# Patient Record
Sex: FEMALE
[De-identification: names, ages, dates, MRNs, and addresses within clinical notes are randomized per-mention and may not be internally consistent; named-entity substitution may affect disease eponyms.]

---

## 2024-09-24 PROBLEM — Z00.00 ENCOUNTER FOR PREVENTIVE HEALTH EXAMINATION: Status: ACTIVE | Noted: 2024-09-24

## 2024-09-26 ENCOUNTER — APPOINTMENT (OUTPATIENT)
Dept: OTOLARYNGOLOGY | Facility: CLINIC | Age: 64
End: 2024-09-26
Payer: MEDICAID

## 2024-09-26 VITALS
SYSTOLIC BLOOD PRESSURE: 132 MMHG | DIASTOLIC BLOOD PRESSURE: 83 MMHG | BODY MASS INDEX: 25.52 KG/M2 | HEIGHT: 63 IN | HEART RATE: 75 BPM | WEIGHT: 144 LBS | OXYGEN SATURATION: 98 %

## 2024-09-26 DIAGNOSIS — R13.10 DYSPHAGIA, UNSPECIFIED: ICD-10-CM

## 2024-09-26 DIAGNOSIS — R49.0 DYSPHONIA: ICD-10-CM

## 2024-09-26 DIAGNOSIS — Z86.79 PERSONAL HISTORY OF OTHER DISEASES OF THE CIRCULATORY SYSTEM: ICD-10-CM

## 2024-09-26 DIAGNOSIS — Z86.39 PERSONAL HISTORY OF OTHER ENDOCRINE, NUTRITIONAL AND METABOLIC DISEASE: ICD-10-CM

## 2024-09-26 PROCEDURE — 31579 LARYNGOSCOPY TELESCOPIC: CPT

## 2024-09-26 PROCEDURE — 99203 OFFICE O/P NEW LOW 30 MIN: CPT | Mod: 25

## 2024-09-26 RX ORDER — AZELASTINE HYDROCHLORIDE 137 UG/1
0.1 SPRAY, METERED NASAL
Refills: 0 | Status: ACTIVE | COMMUNITY

## 2024-09-26 RX ORDER — ALBUTEROL SULFATE 2.5 MG/3ML
(2.5 MG/3ML) SOLUTION RESPIRATORY (INHALATION)
Refills: 0 | Status: ACTIVE | COMMUNITY

## 2024-09-26 RX ORDER — OMEPRAZOLE 10 MG/1
10 CAPSULE, DELAYED RELEASE ORAL
Refills: 0 | Status: ACTIVE | COMMUNITY

## 2024-09-26 RX ORDER — LOSARTAN POTASSIUM 100 MG/1
TABLET, FILM COATED ORAL
Refills: 0 | Status: ACTIVE | COMMUNITY

## 2024-09-26 RX ORDER — FLUTICASONE PROPIONATE 50 MCG
50 SPRAY, SUSPENSION NASAL
Refills: 0 | Status: ACTIVE | COMMUNITY

## 2024-09-26 RX ORDER — ROSUVASTATIN CALCIUM 10 MG/1
10 TABLET, FILM COATED ORAL
Refills: 0 | Status: ACTIVE | COMMUNITY

## 2024-09-26 NOTE — HISTORY OF PRESENT ILLNESS
[de-identified] : FELICIANO JURADO is a 64 year old woman who presents to the [facility] with difficulty phonating and odynophagia.  She is referred by Cherie Lopez PA-C.  This problem has been going on since 1996.  They describe their voice as garbled and raspy. She now does not note periods of normal voicing. She does note specific difficulties with swallowing - mostly with solids, sometimes with liquids and pills She does note frequent classic heartburn symptoms - omeprazole daily. Smoking history: Never smoker

## 2024-09-26 NOTE — ASSESSMENT
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  As such I have ordered them for a Modified Barium Swallow (MBS) to be performed by our SLP team.  I would like to follow up with them in clinic following this swallow study to go over the results and next steps.  The patient expressed understanding and agreement with this plan.  Her voice is not classic for laryngeal dystonia.  It is very tight and has a spastic quality to it. Ultimately she was offered two options for treatment.  Voice therapy or laryngeal botox injection. If we were to do botox would start with 0.8 units bilateral. She elected to proceed forward with voice therapy.   I have asked patient to follow up with her PCP Dr. Krish uRdd as she suffers from severe anxiety and would likely benefit from SSRI or SNRI.  Her anxiety is certainly playing a role in her dysphonia and dysphagia.   I will see her back after MBS.

## 2024-09-26 NOTE — PROCEDURE
[de-identified] : Stroboscopic Laryngoscopy Procedure Note:  Indication:	Assess laryngeal biomechanics and vocal fold oscillation.  Description of Procedure:	Informed consent was verbally obtained from the patient prior to the procedure. The patient was seated in the clinic chair. Topical anesthesia was achieved by first spraying the nasal cavities with 4% lidocaine and nasal decongestant.   Findings:  Supraglottis: no masses or lesions  Glottis:    Structure:                        Right: crisp and shows no lesions or masses                        Left:  crisp and shows no lesions or masses                 Mobility:                        Right:  normal                        Left:  normal                Amplitude:                        Right:  normal                       Left:  normal                Closure: complete                 Wave symmetry:  symmetric  Subglottis: no masses or lesions within the visualized subglottis Visualized airway is widely patent.

## 2024-09-26 NOTE — CONSULT LETTER
[Consult Letter:] : I had the pleasure of evaluating your patient, [unfilled]. [Please see my note below.] : Please see my note below. [Consult Closing:] : Thank you very much for allowing me to participate in the care of this patient.  If you have any questions, please do not hesitate to contact me. [Sincerely,] : Sincerely, [Dear  ___] : Dear ~AMRIT, [DrTanisha  ___] : Dr. PERERA [FreeTextEntry2] : Cherie Lopez PA-C [FreeTextEntry3] : Kyle Galarza M.D. Division of Laryngology | Department of Otolaryngology [address] [address]

## 2024-09-26 NOTE — PROCEDURE
[de-identified] : Stroboscopic Laryngoscopy Procedure Note:  Indication:	Assess laryngeal biomechanics and vocal fold oscillation.  Description of Procedure:	Informed consent was verbally obtained from the patient prior to the procedure. The patient was seated in the clinic chair. Topical anesthesia was achieved by first spraying the nasal cavities with 4% lidocaine and nasal decongestant.   Findings:  Supraglottis: no masses or lesions  Glottis:    Structure:                        Right: crisp and shows no lesions or masses                        Left:  crisp and shows no lesions or masses                 Mobility:                        Right:  normal                        Left:  normal                Amplitude:                        Right:  normal                       Left:  normal                Closure: complete                 Wave symmetry:  symmetric  Subglottis: no masses or lesions within the visualized subglottis Visualized airway is widely patent.

## 2024-09-26 NOTE — HISTORY OF PRESENT ILLNESS
[de-identified] : FELICIANO JURADO is a 64 year old woman who presents to the [facility] with difficulty phonating and odynophagia.  She is referred by Cherie Lopez PA-C.  This problem has been going on since 1996.  They describe their voice as garbled and raspy. She now does not note periods of normal voicing. She does note specific difficulties with swallowing - mostly with solids, sometimes with liquids and pills She does note frequent classic heartburn symptoms - omeprazole daily. Smoking history: Never smoker

## 2024-09-26 NOTE — CONSULT LETTER
[Consult Letter:] : I had the pleasure of evaluating your patient, [unfilled]. [Please see my note below.] : Please see my note below. [Consult Closing:] : Thank you very much for allowing me to participate in the care of this patient.  If you have any questions, please do not hesitate to contact me. [Sincerely,] : Sincerely, [Dear  ___] : Dear ~AMRIT, [DrTanisha  ___] : Dr. PERERA [FreeTextEntry2] : Cherie Lopez PA-C [FreeTextEntry3] : Kyle Galarza M.D. Division of Laryngology | Department of Otolaryngology [address] [address]

## 2024-09-26 NOTE — ASSESSMENT
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  As such I have ordered them for a Modified Barium Swallow (MBS) to be performed by our SLP team.  I would like to follow up with them in clinic following this swallow study to go over the results and next steps.  The patient expressed understanding and agreement with this plan.  Her voice is not classic for laryngeal dystonia.  It is very tight and has a spastic quality to it. Ultimately she was offered two options for treatment.  Voice therapy or laryngeal botox injection. If we were to do botox would start with 0.8 units bilateral. She elected to proceed forward with voice therapy.   I have asked patient to follow up with her PCP Dr. Krish Rudd as she suffers from severe anxiety and would likely benefit from SSRI or SNRI.  Her anxiety is certainly playing a role in her dysphonia and dysphagia.   I will see her back after MBS.

## 2024-10-23 ENCOUNTER — APPOINTMENT (OUTPATIENT)
Dept: SPEECH THERAPY | Facility: HOSPITAL | Age: 64
End: 2024-10-23

## 2024-10-23 ENCOUNTER — APPOINTMENT (OUTPATIENT)
Dept: RADIOLOGY | Facility: HOSPITAL | Age: 64
End: 2024-10-23

## 2024-11-08 ENCOUNTER — APPOINTMENT (OUTPATIENT)
Dept: OTOLARYNGOLOGY | Facility: CLINIC | Age: 64
End: 2024-11-08